# Patient Record
Sex: MALE | Race: WHITE
[De-identification: names, ages, dates, MRNs, and addresses within clinical notes are randomized per-mention and may not be internally consistent; named-entity substitution may affect disease eponyms.]

---

## 2019-03-07 ENCOUNTER — HOSPITAL ENCOUNTER (EMERGENCY)
Dept: HOSPITAL 39 - ER | Age: 49
Discharge: HOME | End: 2019-03-07
Payer: SELF-PAY

## 2019-03-07 VITALS — OXYGEN SATURATION: 93 % | DIASTOLIC BLOOD PRESSURE: 103 MMHG | SYSTOLIC BLOOD PRESSURE: 177 MMHG

## 2019-03-07 VITALS — TEMPERATURE: 97.3 F

## 2019-03-07 DIAGNOSIS — M51.16: ICD-10-CM

## 2019-03-07 DIAGNOSIS — Z87.891: ICD-10-CM

## 2019-03-07 DIAGNOSIS — N12: Primary | ICD-10-CM

## 2019-03-07 DIAGNOSIS — Z79.899: ICD-10-CM

## 2019-03-07 DIAGNOSIS — I10: ICD-10-CM

## 2019-03-07 PROCEDURE — 72131 CT LUMBAR SPINE W/O DYE: CPT

## 2019-03-07 PROCEDURE — 80053 COMPREHEN METABOLIC PANEL: CPT

## 2019-03-07 PROCEDURE — 85025 COMPLETE CBC W/AUTO DIFF WBC: CPT

## 2019-03-07 NOTE — CT
EXAM DESCRIPTION: 



Lumbar Spine



CLINICAL HISTORY: 



48 years, Male, BACK PAIN



COMPARISON: 



None



TECHNIQUE: 

CT of the lumbar spine was performed without IV contrast.  This

exam was performed according to our departmental

dose-optimization program, which includes automated exposure

control, adjustment of the mA and/or kV according to patient size

and/or use of iterative reconstruction technique.



FINDINGS: 



There is no vertebral body fracture or subluxation. Degenerative

disc disease noted at several levels including vacuum disc

phenomenon at L2-3, L3-4 and L4-5. There is concentric disc

bulging at L2-3 with an ovoid collection of gas in the central

canal right of midline posterior to the L3 body concerning for a

large disc extrusion. Facet joint degeneration and ligament

flavum thickening are noted at L2-3 and L3-4 resulting in mild to

moderate central canal stenosis. Moderate bilateral

neuroforaminal stenosis is also likely present at L2-3, L3-4 and

L4-5. The paraspinal soft tissues are unremarkable. Visualized

portions of the sacroiliac joints are unremarkable. Visualized

retroperitoneal soft tissues are unremarkable for noncontrast

technique.



IMPRESSION: 



Degenerative disc disease including vacuum disc phenomenon at

L2-3, L3-4 and L4-5 resulting in central canal and neuroforaminal

stenosis at the involved levels as detailed above. Gas in the

right side of the central canal posterior to the L3 segment

suggests a large right paracentral disc extrusion at L2-3. MRI

may be helpful for further evaluation.



Electronically signed by:  Dallin Bernabe MD  3/7/2019 3:06 PM CST

Workstation: [id number]

## 2019-03-07 NOTE — ED.PDOC
History of Present Illness





- General


Chief Complaint: Back Pain or Injury


Stated Complaint: Back and RLE discomfort


Time Seen by Provider: 03/07/19 14:29


Source: patient, RN notes reviewed, Vital Signs reviewed, family


Exam Limitations: no limitations


Additional Information: 





48 YEAR OLD WHITE MALE PRESENTS TO THE ED WITH  LOW BACK PAIN RADIATING TOT HE 

RIGHT LEG ON AND OFF FOR 5 MONTHS WORSE LATELY 


HE DESCRIBES THE PAIN AS VERY SHARP INTENSE  WORSE WHEN HE SQUATS AND RELIEVED 

BY MOVING AROUND 


HE EXPERIENCED TINGLING AND NUMBNESS IN THE FEET 





- History of Present Illness


Timing/Duration: 24 hours


Severity: moderate


Improving Factors: nothing


Worsening Factors: nothing


Associated Symptoms: denies symptoms


Allergies/Adverse Reactions: 


Allergies





NO KNOWN ALLERGY Allergy (Verified 12/27/15 09:52)


   








Home Medications: 


Ambulatory Orders





Acetaminophen-Caff-Butalbital [Fioricet] 1 ea PO Q6H PRN #40 tab 12/27/15 


Famotidine [Pepcid Tab] 20 mg PO DAILY #30 tab 12/27/15 


levoFLOXacin [Levaquin] 500 mg PO DAILY #5 tab 12/27/15 


Acetamin W/Cod #3 Tab [Tylenol w/CODEINE #3] 1 ea PO Q6HR PRN #40 tab 03/07/19 


Lisinopril 20 mg PO Q24HR #60 tab 03/07/19 


Methylprednisolone [Medrol Dose Chris] 4 mg PO DAILY 6 Days #21 tab 03/07/19 











Review of Systems





- Review of Systems


Constitutional: States: no symptoms reported


EENTM: States: no symptoms reported


Respiratory: States: no symptoms reported


Cardiology: States: no symptoms reported


Gastrointestinal/Abdominal: States: no symptoms reported


Genitourinary: States: no symptoms reported


Musculoskeletal: States: no symptoms reported


Skin: States: no symptoms reported


Neurological: States: no symptoms reported


Endocrine: States: no symptoms reported


Hematologic/Lymphatic: States: no symptoms reported





Past Medical History (General)





- Patient Medical History


Hx Seizures: No


Hx Stroke: No


Hx Congestive Heart Failure: No


Hx Diabetes: No


Surgical History: appendectomy, other





- Vaccination History


Hx Tetanus, Diphtheria Vaccination: Yes - 9/2015


Hx Influenza Vaccination: No


Hx Pneumococcal Vaccination: No





- Social History


Hx Tobacco Use: Yes


Hx Alcohol Use: No





Family Medical History





- Family History


  ** Father


Family History: No Known


Living Status: Still Living





Physical Exam





- Physical Exam


General Appearance: Alert, Comfortable


Eye Exam: bilateral normal


Ears, Nose, Throat: hearing grossly normal, normal ENT inspection, normal pharyn

x


Neck: non-tender, full range of motion, supple


Respiratory: chest non-tender, lungs clear, normal breath sounds, no respiratory

distress, no accessory muscle use


Cardiovascular/Chest: normal peripheral pulses, regular rate, rhythm, no edema, 

no gallop, no JVD


Peripheral Pulses: radial,right: 2+, radial,left: 2+, femoral,right: 2+, 

femoral,left: 2+, popliteal,right: 2+, popliteal,left: 2+


Gastrointestinal/Abdominal: normal bowel sounds, non tender, soft, no 

organomegaly


Back Exam: normal inspection, no CVA tenderness, no vertebral tenderness





Progress





- Results/Orders


Results/Orders: 





                                Laboratory Tests











  03/07/19 03/07/19





  15:07 15:07


 


WBC  8.7 


 


RBC  5.19 


 


Hgb  17.6 


 


Hct  51.6 


 


MCV  99.4 H 


 


MCH  34.0 H 


 


MCHC  34.2 


 


RDW  13.6 


 


Plt Count  292 


 


MPV  8.7 


 


Absolute Neuts (auto)  4.70 


 


Absolute Lymphs (auto)  3.10 


 


Absolute Monos (auto)  0.60 


 


Absolute Eos (auto)  0.20 


 


Absolute Basos (auto)  0.10 


 


Neutrophils %  53.9 


 


Lymphocytes %  35.3 


 


Monocytes %  7.3 


 


Eosinophils %  2.7 


 


Basophils %  0.8 


 


Sodium   136


 


Potassium   3.9


 


Chloride   100 L


 


Carbon Dioxide   26


 


Anion Gap   13.9


 


BUN   9


 


Creatinine   0.82


 


BUN/Creatinine Ratio   11.0


 


Random Glucose   98


 


Serum Osmolality   270.6 L


 


Calcium   9.2


 


Total Bilirubin   0.9


 


AST   29


 


ALT   37


 


Alkaline Phosphatase   99


 


Serum Total Protein   7.9


 


Albumin   4.8


 


Globulin   3.1


 


Albumin/Globulin Ratio   1.5














Departure





- Departure


Clinical Impression: 


 Pyelonephritis, Lumbar disc disease with radiculopathy, Hypertension





Time of Disposition: 16:03


Disposition: Discharge to Home or Self Care


Condition: Good


Departure Forms:  ED Discharge - Pt. Copy, Patient Portal Self Enrollment


Instructions:  DI for Low Back Pain


Diet: resume usual diet


Prescriptions: 


Acetamin W/Cod #3 Tab [Tylenol w/CODEINE #3] 1 ea PO Q6HR PRN #40 tab


 PRN Reason: Mild To Moderate Pain


Lisinopril 20 mg PO Q24HR #60 tab


Methylprednisolone [Medrol Dose Chris] 4 mg PO DAILY 6 Days #21 tab


Home Medications: 


Ambulatory Orders





Acetaminophen-Caff-Butalbital [Fioricet] 1 ea PO Q6H PRN #40 tab 12/27/15 


Famotidine [Pepcid Tab] 20 mg PO DAILY #30 tab 12/27/15 


levoFLOXacin [Levaquin] 500 mg PO DAILY #5 tab 12/27/15 


Acetamin W/Cod #3 Tab [Tylenol w/CODEINE #3] 1 ea PO Q6HR PRN #40 tab 03/07/19 


Lisinopril 20 mg PO Q24HR #60 tab 03/07/19 


Methylprednisolone [Medrol Dose Chris] 4 mg PO DAILY 6 Days #21 tab 03/07/19 








Comments: 


CT FINDINGS EXPLAINE TOT HE PATIENT 


HE NEEDS A MRI THRU HIS PCP 


FOLLOW UP RECOMMENDED FOR HIS HYPERTENSION AND COUNSELLED ABOUT HIS NICOTINE 

DEPENDANCE